# Patient Record
(demographics unavailable — no encounter records)

---

## 2025-02-18 NOTE — ASSESSMENT
[FreeTextEntry1] : - 43 year-old female, exam and imaging consistent with ankle sprain today -We discussed ankle sprains in detail, she initially was able to weight-bear however she is having too much pain with weightbearing at this point.  I recommended crutches to offload her ankle.  She may weight-bear as tolerated as she progressively improves. -She is given a cam boot today, she advised to wear this majority of the time but may remove for hygiene and occasional range of motion exercises of the ankle for the next week. -Ice and elevation for edema management are emphasized today -She is going to follow-up with her foot and ankle specialist next week, we discussed to further evaluate

## 2025-02-18 NOTE — HISTORY OF PRESENT ILLNESS
[FreeTextEntry1] : 43-year-old female presenting for 43-year-old female presenting for right ankle pain and swelling.  She states that she had a slip on ice, she is not sure the exact mechanism but believes this was an inversion injury to her foot when describing the event.  She also has foot pain.  She has not had prior ankle injuries.  She states initially after the event she was able to walk for several hours however being hard when trying to get out she had pain and swelling and inability to ambulate.  She is seen in urgent care today and sent here for further review.  She is otherwise healthy.

## 2025-02-18 NOTE — PHYSICAL EXAM
[de-identified] : Right foot and ankle -Tenderness and swelling over the AITFL and ATFL, negative anterior drawer plantarflexion and dorsiflexion although limited by pain -Able to plantarflex and dorsiflex on her own -No open wounds or abrasions -Swelling over lateral ankle -No tenderness over the midfoot, there is no plantar ecchymosis -Denies numbness or tingling in her foot [de-identified] : X-rays from urgent care Three-view x-ray right ankle including AP lateral and oblique were taken and personally viewed these demonstrate a reduced ankle mortise no evidence of fracture  Three-view x-ray right foot including AP lateral and oblique were taken in urgent care, these were personally reviewed by myself demonstrate no widening of the TMT joint, no evidence of fracture or dislocation.

## 2025-03-21 NOTE — DISCUSSION/SUMMARY
[de-identified] : Because the patient is flying tomorrow, I prescribed her meloxicam 15 mg to use for her pain.  I also prescribed her diclofenac sodium gel 1%.  Patient will use proper shoewear.  Patient will elevate her legs on the plane.  She will use compression socks.  Hold off on advanced imaging.  If she returns from [place] with increased pain to the right ankle/midfoot, at that time I will consider an MRI of the right ankle without contrast to evaluate the lateral ligaments and lateral midfoot.  The patient will call if pain increases when she returns from her trip.  All of her questions were answered.  Patient understood and agreed to the treatment course.

## 2025-03-21 NOTE — PHYSICAL EXAM
[de-identified] : Right ankle Physical Examination:  General: Alert and oriented x3.  In no acute distress.  Pleasant in nature with a normal affect.  No apparent respiratory distress.  Erythema, Warmth, Rubor: Negative Swelling: Negative  ROM: 1. Dorsiflexion: 10 degrees 2. Plantarflexion: 40 degrees 3. Inversion: 30 degrees 4. Eversion: 20 degrees 5. Subtalar: 10 degrees  Tenderness to Palpation:  1. Lateral Malleolus: Negative 2. Medial Malleolus: Negative 3. Proximal Fibular Pain: Negative 4. Heel Pain: Negative 5. Cuboid: Negative 6. Navicular: Negative 7. Tibiotalar Joint: Negative 8. Subtalar Joint: Negative 9. Posterior Recess: Negative  Tendon Pain: 1. Achilles: Negative 2. Peroneals: Negative 3. Posterior Tibialis: Negative 4. Tibialis Anterior: Negative  Ligament Pain: 1. ATFL: + 2. CFL: Negative  3. PTFL: Negative 4. Deltoid Ligaments: Negative 5. Lis Franc Ligament: Negative  Stability:  1. Anterior Drawer: Negative 2. Posterior Drawer: Negative  Strength: 5/5 TA/GS/EHL  Pulses: 2+ DP/PT Pulses  Neuro: Intact motor and sensory  Additional Test: 1. Calcaneal Squeeze Test: Negative 2. Syndesmosis Squeeze Test: Negative   Right foot Physical Examination:  General: Alert and oriented x3.  In no acute distress.  Pleasant in nature with a normal affect.  No apparent respiratory distress.  Erythema, Warmth, Rubor: Negative Swelling: Negative  ROM Ankle: 1. Dorsiflexion: 10 degrees 2. Plantarflexion: 40 degrees 3. Inversion: 30 degrees 4. Eversion: 20 degrees  ROM of digits: Normal  Pes Planus: Negative Pes Cavus: Negative  Bunion: Negative Tailor's Bunion (Bunionette): Negative Hammer Toe Deformity/Deformities: Negative  Tenderness to Palpation:  1. Heel Pain: Negative 2. Midfoot Pain: Positive pain in the lateral midfoot. 3. First MTP Joint: Negative 4. Lis Franc Joint: Negative  Tenderness Metatarsals: 1st MT: Negative 2nd MT: Negative 3rd MT: Negative 4th MT: Negative 5th MT: Negative Base of the 5th MT: Negative  Ligament Pain: 1. Lis Franc Ligament: Negative 2. Plantar Fascia Ligament: Negative  Strength:  5/5 TA/GS/EHL/FHL/EDL/ADD/ABD  Pulses: 2+ DP/PT Pulses  Capillary Refill Toes: <2 seconds  Neuro: Intact motor and sensory throughout  Additional Test: 1. French's Squeeze Test: Negative 2. Calcaneal Squeeze Test: Negative [de-identified] : Radiology imaging reviewed in office with the patient on 3/21/2025 and I agree with the radiologist's impression below.  Procedure was performed at the [facility]  EXAM: ANKLE RT 3 VIEWS  EXAM: FOOT MIN 3 VWS RIGHT   PROCEDURE DATE: 02/18/2025   INTERPRETATION: CLINICAL INDICATION: right ankle and foot pain  EXAM: Frontal oblique lateral right ankle and foot from 2/18/2025 at 1006. No similar prior studies available for comparison.  IMPRESSION: No fractures or dislocations.  Congruent ankle mortise with smooth and intact talar dome. Tarsometatarsal alignment maintained without evidence for a Lisfranc injury. Preserved remaining visualized joint spaces and no joint margin erosions.  Os trigonum behind talus on lateral view. No calcaneal spurring and unremarkable appearing Achilles tendon shadow.  No lytic or blastic lesions.  --- End of Report ---       LAMONTE BINGHAM MD; Attending Radiologist This document has been electronically signed. Feb 18 2025 10:17AM

## 2025-03-21 NOTE — HISTORY OF PRESENT ILLNESS
[FreeTextEntry1] : The patient is a 44 yo female who present wit right ankle pain.  She rolled her ankle on 2/17/25 slipped on ice.  Went to urgent care on 2/18/2025.  She wore a boot for 2 weeks.  Th is her first follow-up since [facility]. Patient continues to have pain of her right ankle.  She commutes to [place] daily which increase pain and swelling to the right ankle.  Physical activity causes pain and swelling.  Swelling located top of foot.  Patient in sneakers walking without assistance.  She has no pain at rest today.  Her pain scale can increase to a 4-5 out of 10.  She is more worrisome about the swelling and flying in the near future.  No other complaints.  She does have a flight tomorrow [place] business trip.